# Patient Record
Sex: MALE | Race: WHITE | URBAN - METROPOLITAN AREA
[De-identification: names, ages, dates, MRNs, and addresses within clinical notes are randomized per-mention and may not be internally consistent; named-entity substitution may affect disease eponyms.]

---

## 2019-09-10 ENCOUNTER — HOSPITAL ENCOUNTER (OUTPATIENT)
Facility: CLINIC | Age: 49
Discharge: HOME | End: 2019-09-10
Attending: COLON & RECTAL SURGERY | Admitting: COLON & RECTAL SURGERY
Payer: COMMERCIAL

## 2019-09-10 VITALS
DIASTOLIC BLOOD PRESSURE: 67 MMHG | RESPIRATION RATE: 19 BRPM | OXYGEN SATURATION: 95 % | HEART RATE: 49 BPM | SYSTOLIC BLOOD PRESSURE: 94 MMHG

## 2019-09-10 LAB — COLONOSCOPY: NORMAL

## 2019-09-10 PROCEDURE — 25000128 H RX IP 250 OP 636: Performed by: COLON & RECTAL SURGERY

## 2019-09-10 PROCEDURE — G0121 COLON CA SCRN NOT HI RSK IND: HCPCS | Performed by: COLON & RECTAL SURGERY

## 2019-09-10 PROCEDURE — 45378 DIAGNOSTIC COLONOSCOPY: CPT | Performed by: COLON & RECTAL SURGERY

## 2019-09-10 PROCEDURE — G0500 MOD SEDAT ENDO SERVICE >5YRS: HCPCS | Performed by: COLON & RECTAL SURGERY

## 2019-09-10 RX ORDER — NALOXONE HYDROCHLORIDE 0.4 MG/ML
.1-.4 INJECTION, SOLUTION INTRAMUSCULAR; INTRAVENOUS; SUBCUTANEOUS
Status: DISCONTINUED | OUTPATIENT
Start: 2019-09-10 | End: 2019-09-10 | Stop reason: HOSPADM

## 2019-09-10 RX ORDER — FLUMAZENIL 0.1 MG/ML
0.2 INJECTION, SOLUTION INTRAVENOUS
Status: DISCONTINUED | OUTPATIENT
Start: 2019-09-10 | End: 2019-09-10 | Stop reason: HOSPADM

## 2019-09-10 RX ORDER — ONDANSETRON 4 MG/1
4 TABLET, ORALLY DISINTEGRATING ORAL EVERY 6 HOURS PRN
Status: DISCONTINUED | OUTPATIENT
Start: 2019-09-10 | End: 2019-09-10 | Stop reason: HOSPADM

## 2019-09-10 RX ORDER — PROCHLORPERAZINE MALEATE 10 MG
10 TABLET ORAL EVERY 6 HOURS PRN
Status: DISCONTINUED | OUTPATIENT
Start: 2019-09-10 | End: 2019-09-10 | Stop reason: HOSPADM

## 2019-09-10 RX ORDER — ONDANSETRON 2 MG/ML
4 INJECTION INTRAMUSCULAR; INTRAVENOUS
Status: DISCONTINUED | OUTPATIENT
Start: 2019-09-10 | End: 2019-09-10 | Stop reason: HOSPADM

## 2019-09-10 RX ORDER — FENTANYL CITRATE 50 UG/ML
INJECTION, SOLUTION INTRAMUSCULAR; INTRAVENOUS PRN
Status: DISCONTINUED | OUTPATIENT
Start: 2019-09-10 | End: 2019-09-10 | Stop reason: HOSPADM

## 2019-09-10 RX ORDER — ONDANSETRON 2 MG/ML
4 INJECTION INTRAMUSCULAR; INTRAVENOUS EVERY 6 HOURS PRN
Status: DISCONTINUED | OUTPATIENT
Start: 2019-09-10 | End: 2019-09-10 | Stop reason: HOSPADM

## 2019-09-10 RX ORDER — LIDOCAINE 40 MG/G
CREAM TOPICAL
Status: DISCONTINUED | OUTPATIENT
Start: 2019-09-10 | End: 2019-09-10 | Stop reason: HOSPADM

## 2019-09-10 NOTE — H&P
"Pre-Endoscopy History and Physical   Dean Madrid MRN# [id number]   YOB: 1970 Age: 49 year old   Date of Procedure: 9/10/2019   Primary care provider: Kaela Bautista   Type of Endoscopy: colonoscopy   Reason for Procedure: FH of polyps   Type of Anesthesia Anticipated: Moderate Sedation   HPI:   Dean is a 49 year old male with a FH of polyps.  He last had a colonoscopy in 2014 that was normal.  He denies BRBPR, abdominal pain, nausea/vomiting, changes in bowel habits or unintentional weight loss.  His sister had adenomatous polyps in her 30s.    Allergies   Allergen Reactions     Penicillins       None      Patient Active Problem List   Diagnosis     Allergic rhinitis due to other allergen     Hemorrhoids     CARDIOVASCULAR SCREENING; LDL GOAL LESS THAN 160     Diverticulosis      Past Medical History:   Diagnosis Date     Allergic rhinitis due to other allergen      Unspecified hemorrhoids without mention of complication       Past Surgical History:   Procedure Laterality Date     C NONSPECIFIC PROCEDURE      S/P T&A     COLONOSCOPY       HC HEMORRHOIDECTOMY W BANDING/LIGATION  2003    Hemorrhoidectomy      Social History     Tobacco Use     Smoking status: Current Some Day Smoker     Smokeless tobacco: Former User     Types: Chew     Quit date: 10/1/2014   Substance Use Topics     Alcohol use: No      Family History   Problem Relation Age of Onset     Eye Disorder Mother         macular degeneration     Cancer Father         esophageal     Diabetes Paternal Grandmother      Cerebrovascular Disease Paternal Grandmother      Cancer - colorectal Sister         polyps     Thyroid Disease Sister      Psychotic Disorder Sister       PHYSICAL EXAM:   /84   Resp 16   SpO2 99%  Estimated body mass index is 26.48 kg/m  as calculated from the following:    Height as of 5/15/15: 1.746 m (5' 8.75\").    Weight as of 5/15/15: 80.7 kg (178 lb).   RESP: lungs clear to auscultation - no rales, rhonchi or " wheezes   CV: regular rates and rhythm   ASA Class 2 - Mild systemic disease    Assessment: 50 y/o gentleman with a FH of polyps    Plan: Colonoscopy with moderate sedation.  Risks of the procedure were discussed including, but not limited to, bleeding, perforation and missed lesions.  Patient understands and is willing to proceed.    Alek Schaffer MD ....................  9/10/2019   7:41 AM  [facility] Staff  [phone]

## 2024-04-24 ENCOUNTER — MEDICAL CORRESPONDENCE (OUTPATIENT)
Dept: HEALTH INFORMATION MANAGEMENT | Facility: CLINIC | Age: 54
End: 2024-04-24
Payer: COMMERCIAL

## 2024-05-01 ENCOUNTER — TRANSCRIBE ORDERS (OUTPATIENT)
Dept: OTHER | Age: 54
End: 2024-05-01

## 2024-05-01 DIAGNOSIS — Z12.11 ENCOUNTER FOR SCREENING FOR MALIGNANT NEOPLASM OF COLON: Primary | ICD-10-CM

## (undated) DEVICE — SOL WATER IRRIG 1000ML BOTTLE 2F7114

## (undated) DEVICE — KIT SURGICAL TURNOVER FVSD-01D

## (undated) RX ORDER — FENTANYL CITRATE 50 UG/ML
INJECTION, SOLUTION INTRAMUSCULAR; INTRAVENOUS
Status: DISPENSED
Start: 2019-09-10